# Patient Record
(demographics unavailable — no encounter records)

---

## 2022-03-26 NOTE — NUR
DR CHRISTIAN CALLED AND UPDATED PATIENT STATUS AND RYTHM ON MONITOR AND SAID TO REINSTATE 
AMIODARONE DRIP WITH NO BOLUS PER PROTOCOL

## 2022-03-26 NOTE — NUR
Received pt from night shift. Pt is a new admission, admission completed by night shift 
nurse. Pt admitted for abdominal pain for three days. DX is a-fib and RVR. Pt admitted to 
telemetry floor currently presenting with A-fib . No complaint of abdominal pain this 
morning so far. Pt is asleep in room, will continue to monitor.

## 2022-03-26 NOTE — NUR
Report received from [facility]. Patient transported from ED to Room 316 via gurney 
accompanied by 1 RN, without any incident. Patient is awake, alert, A/O x4. Ambulatory with 
steady gait. Denies pain at this time. No signs of acute distress noted. On NC @5LPM, with 
O2 sat 99%. Body assessment done with no skin issues. Belonging lists checked and placed 
with chart. VS taken T=97.8, HR= 87, RR=16, KO=170/86. Call light within reach. Bed at 
lowest position, brakes on, siderails x2. Will continue to monitor.

## 2022-03-26 NOTE — NUR
RECEIVED A TEXT FROM DR TAPIA SAYING HE DON'T KNOW THE PATIENT, TRIED TO GIVE A BRIEF 
HISTORY OF PATIENT AND SAID START ON AMIODARONE DRIP.

## 2022-03-26 NOTE — NUR
TRIED TO REACH BACK DR TAPIA, PATIENT ON COREG, LANOXIN AND HR IS NOW SUSTAINING -112, 
/56, DENIES PAIN, AMIO DRIP HAVEN'T STARTED YET  AND ADVISED TO CANCEL AMIODARONE DRIP

## 2022-03-26 NOTE — NUR
pt admitted, bed assigned report given to accepting RN, all questions 
answered, paperwok and belongings accounted. pt udated as to plan of care. 
vital stable, NAD noted, medication infusing

## 2022-03-27 NOTE — NUR
AWAKE ALERT AND ORIENTED X3, DENIES CHEST PAIN BUT SLIGHT SOB ON EXERTION, CONTINUE WITH O2 
3L NC SATURATING 93-96%, CONTINUE WITH AMIODARONE DRIP PER PROTOCOL HEART RATE SUSTAINING 
103-112/MIN. CONTINUE WITH BILLY OBSERVATION

## 2022-03-27 NOTE — NUR
Slept intermittently. C/o some back pain and abdominal pain. Pt given norco, tolerated 
medication well. Amiodarone running at 0.5mg/min, tolerating well. Afib on monitor. Safety 
and comfort maintained, will endorse to day shift.

## 2022-03-28 NOTE — NUR
Patient alert oriented, no sob no chest pain, tele monitor A fib with some PVC. Patient has 
episode of seizures noted at this time, side rails padded, complain of foot pain, will 
medicate as ordered, continue to monitor.

## 2022-03-28 NOTE — NUR
Received patient awake and alert. Denies pain but reports SOB. Vital signs taken,stable and 
O2 on 2L NC sating at 97%. Skilled nursing assessment done, no skin issues. Discoloration on 
bilateral lower ext. Safety initiated. Call light within reach will continue monitor.

## 2022-03-28 NOTE — NUR
Pt resting in bed. Denies pain or SOB. TELE Afib Aflutter at 85. No new skin issues noted. 
Comfort and safety measures maintained t/o shift. All meds given as ordered. All needs met.

## 2022-03-29 NOTE — NUR
dc  orders received noted and carried out dc instruction and education given to the pt .dc 
heplock per md orders.pt said she will follow up with her pcp in one week.pt left the 
facility via private car in stable condition

## 2022-03-29 NOTE — NUR
Patient asleep but arousable, no sob no chest pain, tele monitor Afib with some PVC, 
complain of right and left foot gout pain given Ultram po as ordered, no seizure activity 
noted, cont to monitor.